# Patient Record
Sex: FEMALE | Race: OTHER | HISPANIC OR LATINO | URBAN - METROPOLITAN AREA
[De-identification: names, ages, dates, MRNs, and addresses within clinical notes are randomized per-mention and may not be internally consistent; named-entity substitution may affect disease eponyms.]

---
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
## 2025-04-17 NOTE — ED PEDIATRIC NURSE NOTE - DIAGNOSIS
Medication: colchicine passed protocol.   Last office visit date: 2/11/2025  Next appointment scheduled?: Yes   Number of refills given: 1    Upcoming appointment scheduled on: 7/15/2025   Per last office visit, patient is to follow up in July.   Last refill on: 8/9/2024       (1) Other Diagnosis